# Patient Record
Sex: FEMALE | Race: WHITE | URBAN - METROPOLITAN AREA
[De-identification: names, ages, dates, MRNs, and addresses within clinical notes are randomized per-mention and may not be internally consistent; named-entity substitution may affect disease eponyms.]

---

## 2022-09-22 ENCOUNTER — OFFICE VISIT (OUTPATIENT)
Dept: URBAN - METROPOLITAN AREA CLINIC 26 | Facility: CLINIC | Age: 47
End: 2022-09-22
Payer: COMMERCIAL

## 2022-09-22 DIAGNOSIS — H40.053 OCULAR HYPERTENSION, BILATERAL: Primary | ICD-10-CM

## 2022-09-22 DIAGNOSIS — D23.10 OTH BENIGN NEOPLASM SKIN/ UNSP EYELID, INCLUDING CANTHUS: ICD-10-CM

## 2022-09-22 PROCEDURE — 92134 CPTRZ OPH DX IMG PST SGM RTA: CPT | Performed by: OPTOMETRIST

## 2022-09-22 PROCEDURE — 92004 COMPRE OPH EXAM NEW PT 1/>: CPT | Performed by: OPTOMETRIST

## 2022-09-22 PROCEDURE — 92133 CPTRZD OPH DX IMG PST SGM ON: CPT | Performed by: OPTOMETRIST

## 2022-09-22 ASSESSMENT — INTRAOCULAR PRESSURE
OS: 30
OD: 25
OS: 26
OD: 24

## 2022-09-22 ASSESSMENT — KERATOMETRY
OD: 46.38
OS: 46.25

## 2022-09-22 ASSESSMENT — VISUAL ACUITY
OS: 20/20
OD: 20/20

## 2022-09-22 NOTE — IMPRESSION/PLAN
Impression: Ocular hypertension, bilateral: H40.053. Plan: today's IOP: 25/26
onh appears healthy rim intact
rnfl oct (09/22/22) od: 92, wnl os: 94. wnl
low suspicion for glaucoma. no treatment indicated.  
rtc 3-4 months vf 24-2 and IOP check

## 2022-09-22 NOTE — IMPRESSION/PLAN
Impression: Oth benign neoplasm skin/ unsp eyelid, including canthus: D23.10. Plan: small nevus to left lower lid. pt reports present for ~2-5 years. history of melanoma to leg and wrist. will have nevus evaluated by oculoplastics, given pt hx.

## 2022-11-28 ENCOUNTER — OFFICE VISIT (OUTPATIENT)
Dept: URBAN - METROPOLITAN AREA CLINIC 34 | Facility: CLINIC | Age: 47
End: 2022-11-28
Payer: COMMERCIAL

## 2022-11-28 DIAGNOSIS — D23.10 OTH BENIGN NEOPLASM SKIN/ UNSP EYELID, INCLUDING CANTHUS: Primary | ICD-10-CM

## 2022-11-28 PROCEDURE — 92285 EXTERNAL OCULAR PHOTOGRAPHY: CPT | Performed by: OPHTHALMOLOGY

## 2022-11-28 PROCEDURE — 99202 OFFICE O/P NEW SF 15 MIN: CPT | Performed by: OPHTHALMOLOGY

## 2022-11-28 NOTE — IMPRESSION/PLAN
Impression: Oth benign neoplasm skin/ unsp eyelid, including canthus: D23.10. Plan: Neoplasm LLL. Will seek authorization from insurance and will preform neoplasm excision with biopsy next visit. Concerning for malignancy due to recent onsent, pigmentation, and hx of melanoma.

## 2022-11-29 ENCOUNTER — PROCEDURE (OUTPATIENT)
Dept: URBAN - METROPOLITAN AREA CLINIC 30 | Facility: CLINIC | Age: 47
End: 2022-11-29
Payer: COMMERCIAL

## 2022-11-29 DIAGNOSIS — D48.1 NEOPLASM OF UNCERTAIN BEHAVIOR OF CONNCTV/SOFT TISS: Primary | ICD-10-CM

## 2022-11-29 PROCEDURE — 11900 INJECT SKIN LESIONS </W 7: CPT | Performed by: PHYSICIAN ASSISTANT

## 2022-11-29 PROCEDURE — 67840 REMOVE EYELID LESION: CPT | Performed by: PHYSICIAN ASSISTANT

## 2022-11-29 NOTE — IMPRESSION/PLAN
Impression: Neoplasm of uncertain behavior of connctv/soft tiss: D48.1. Plan: LLL -- slightly raised, pigmented lesion, irregular/nondistinct borders Personal h/o malignant melanoma and family h/o ocular melanoma in mother. Will bx today to r/o malignancy. The eyelid was cleaned and then anesthetized with 2% lidocaine with epinephrine. The lesion was fully excised with Virginie scissors, flush with the adjacent skin. The specimen was submitted for histopathologic diagnosis. The biopsy site was cauterized as necessary to achieve hemostasis. Antibiotic ointment was applied to the biopsy site. The patient tolerated the procedure well, and the procedure was completed without complications. The patient will be contacted with the results of pathology. Was performed by Dr. Juan Alberto Callahan with assist by LOGAN HERNANDEZ [facility] Erythromycin ointment sample was given to pt to use BID for next few days. Will call with bx results. F/u PRN Pt gave verbal consent that can leave bx results on voicemail.

## 2023-01-24 ENCOUNTER — OFFICE VISIT (OUTPATIENT)
Dept: URBAN - METROPOLITAN AREA CLINIC 26 | Facility: CLINIC | Age: 48
End: 2023-01-24
Payer: COMMERCIAL

## 2023-01-24 DIAGNOSIS — H16.223 KERATOCONJUNCTIVITIS SICCA, BILATERAL: ICD-10-CM

## 2023-01-24 DIAGNOSIS — H40.053 OCULAR HYPERTENSION, BILATERAL: Primary | ICD-10-CM

## 2023-01-24 PROCEDURE — 76514 ECHO EXAM OF EYE THICKNESS: CPT | Performed by: OPTOMETRIST

## 2023-01-24 PROCEDURE — 92083 EXTENDED VISUAL FIELD XM: CPT | Performed by: OPTOMETRIST

## 2023-01-24 PROCEDURE — 99213 OFFICE O/P EST LOW 20 MIN: CPT | Performed by: OPTOMETRIST

## 2023-01-24 ASSESSMENT — INTRAOCULAR PRESSURE
OD: 23
OS: 22

## 2023-01-24 NOTE — IMPRESSION/PLAN
Impression: Keratoconjunctivitis sicca, bilateral: A69.126. Plan: Recommend artificial tears at least 4 times a day and gel drop or tear ointment at bedtime, Omega 3 fatty acids (2-3,000 mg) daily.

## 2023-01-24 NOTE — IMPRESSION/PLAN
Impression: Ocular hypertension, bilateral: H40.053.
-pachys: [phone]/[phone] Plan: hx of ocular htn
today's IOP: 23/22
onh appears healthy rim intact
rnfl oct (09/22/22) od: 92, wnl os: 94. wnl
vf 24-2 (01/24/23) od: inferior scatter os: superior scatter 
low suspicion for glaucoma. no treatment indicated.  
c Sep/Oct' 2023 for complete exam, rnfl oct and vf 24-2

## 2023-09-25 ENCOUNTER — OFFICE VISIT (OUTPATIENT)
Dept: URBAN - METROPOLITAN AREA CLINIC 26 | Facility: CLINIC | Age: 48
End: 2023-09-25
Payer: COMMERCIAL

## 2023-09-25 DIAGNOSIS — H16.223 KERATOCONJUNCTIVITIS SICCA, NOT SPECIFIED AS SJOGREN'S, BILATERAL: ICD-10-CM

## 2023-09-25 DIAGNOSIS — H40.053 OCULAR HYPERTENSION, BILATERAL: Primary | ICD-10-CM

## 2023-09-25 PROCEDURE — 92134 CPTRZ OPH DX IMG PST SGM RTA: CPT | Performed by: OPTOMETRIST

## 2023-09-25 PROCEDURE — 92014 COMPRE OPH EXAM EST PT 1/>: CPT | Performed by: OPTOMETRIST

## 2023-09-25 PROCEDURE — 92083 EXTENDED VISUAL FIELD XM: CPT | Performed by: OPTOMETRIST

## 2023-09-25 PROCEDURE — 92133 CPTRZD OPH DX IMG PST SGM ON: CPT | Performed by: OPTOMETRIST

## 2023-09-25 ASSESSMENT — KERATOMETRY
OS: 45.75
OD: 46.38

## 2023-09-25 ASSESSMENT — INTRAOCULAR PRESSURE
OD: 24
OS: 23

## 2023-09-25 ASSESSMENT — VISUAL ACUITY
OD: 20/20
OS: 20/20